# Patient Record
(demographics unavailable — no encounter records)

---

## 2024-10-13 NOTE — PHYSICAL EXAM
[Normal Breath Sounds] : Normal breath sounds [Normal Heart Sounds] : normal heart sounds [Normal Rate and Rhythm] : normal rate and rhythm [No Rash or Lesion] : No rash or lesion [Alert] : alert [Oriented to Person] : oriented to person [Oriented to Place] : oriented to place [Oriented to Time] : oriented to time [Calm] : calm [Abdomen Masses] : No abdominal masses [Abdomen Tenderness] : ~T No ~M abdominal tenderness [JVD] : no jugular venous distention  [Wheezing] : no wheezing was heard [de-identified] : healthy, NAD [de-identified] : NC/AT [de-identified] : ANALILIA, steady gait

## 2024-10-13 NOTE — PHYSICAL EXAM
[Normal Breath Sounds] : Normal breath sounds [Normal Heart Sounds] : normal heart sounds [Normal Rate and Rhythm] : normal rate and rhythm [No Rash or Lesion] : No rash or lesion [Alert] : alert [Oriented to Person] : oriented to person [Oriented to Place] : oriented to place [Oriented to Time] : oriented to time [Calm] : calm [Abdomen Masses] : No abdominal masses [Abdomen Tenderness] : ~T No ~M abdominal tenderness [JVD] : no jugular venous distention  [Wheezing] : no wheezing was heard [de-identified] : healthy, NAD [de-identified] : NC/AT [de-identified] : ANALILIA, steady gait

## 2024-10-13 NOTE — ASSESSMENT
[FreeTextEntry1] : Pleasant 69-year-old gentleman who presents for consultation regarding hemorrhoids.  Patient had been troubled with irritable bowel syndrome since his 20s. He often was treated with Lomotil and Paregoric and had chronic left lower quadrant abdominal pain. These symptoms eventually came under control. Patient had his last colonoscopy by Dr. Roberto Lopez in 2023 and was reportedly normal. He complains today of some rectal bleeding noted after bowel movements. This is seen dripping in the bowl. He has no pain or tissue prolapse.  Inspection of the anorectal area is unremarkable. Digital exam reveals no palpable mass. Sigmoidoscopy to 17 cm is negative. Anoscopy confirms the presence of 3 quadrant internal hemorrhoids.  I believe that his bleeding is from his internal hemorrhoids and I would recommend a trial of hemorrhoidal banding. The procedure was explained in detail to the patient.

## 2024-10-13 NOTE — PHYSICAL EXAM
[Normal Breath Sounds] : Normal breath sounds [Normal Heart Sounds] : normal heart sounds [Normal Rate and Rhythm] : normal rate and rhythm [No Rash or Lesion] : No rash or lesion [Alert] : alert [Oriented to Person] : oriented to person [Oriented to Place] : oriented to place [Oriented to Time] : oriented to time [Calm] : calm [Abdomen Masses] : No abdominal masses [Abdomen Tenderness] : ~T No ~M abdominal tenderness [JVD] : no jugular venous distention  [Wheezing] : no wheezing was heard [de-identified] : healthy, NAD [de-identified] : NC/AT [de-identified] : ANALILIA, steady gait

## 2024-10-13 NOTE — PHYSICAL EXAM
[Normal Breath Sounds] : Normal breath sounds [Normal Heart Sounds] : normal heart sounds [Normal Rate and Rhythm] : normal rate and rhythm [No Rash or Lesion] : No rash or lesion [Alert] : alert [Oriented to Person] : oriented to person [Oriented to Place] : oriented to place [Oriented to Time] : oriented to time [Calm] : calm [Abdomen Masses] : No abdominal masses [Abdomen Tenderness] : ~T No ~M abdominal tenderness [JVD] : no jugular venous distention  [Wheezing] : no wheezing was heard [de-identified] : healthy, NAD [de-identified] : NC/AT [de-identified] : ANALILIA, steady gait

## 2024-10-13 NOTE — HISTORY OF PRESENT ILLNESS
[FreeTextEntry1] : 70 y/o male with history of rectal bleeding presents today for initial consultation. He reports history of IBS and reports alternating diarrhea/constipation.  Several months ago he developed severe constipation and noted straining and prolonged sitting to defecate.  He now notes bright red blood in the toilet bowl after defecation.  He has started taking Miralax and Metamucil.  He reports several bowel movements daily.  In the AM he has a formed bowel movement and denies any straining/bleeding.  Later in the day, he notes that he has another bowel movement, but notes that he is straining more and the stool is thinner.  He examines the stool and notes blood in the toilet, but no blood on the tissue.  He denies pain with defecation and reports history of hemorrhoids, but states that they are not bothering him at this time.   His last colonoscopy was May 2023 by his gastroenterologist Dr. Lopez.

## 2024-10-13 NOTE — HISTORY OF PRESENT ILLNESS
[FreeTextEntry1] : 68 y/o male with history of rectal bleeding presents today for initial consultation. He reports history of IBS and reports alternating diarrhea/constipation.  Several months ago he developed severe constipation and noted straining and prolonged sitting to defecate.  He now notes bright red blood in the toilet bowl after defecation.  He has started taking Miralax and Metamucil.  He reports several bowel movements daily.  In the AM he has a formed bowel movement and denies any straining/bleeding.  Later in the day, he notes that he has another bowel movement, but notes that he is straining more and the stool is thinner.  He examines the stool and notes blood in the toilet, but no blood on the tissue.  He denies pain with defecation and reports history of hemorrhoids, but states that they are not bothering him at this time.   His last colonoscopy was May 2023 by his gastroenterologist Dr. Lopez.

## 2024-10-13 NOTE — REVIEW OF SYSTEMS
[As Noted in HPI] : as noted in HPI [Arthralgias] : arthralgias [Negative] : Heme/Lymph [FreeTextEntry3] : wears eyeglasses [FreeTextEntry9] : back pain, arthritic joint pain

## 2024-11-06 NOTE — HISTORY OF PRESENT ILLNESS
[FreeTextEntry1] : 69-year-old gentleman whom I had seen in the past for what was presumed to be bleeding from hemorrhoids.  He presents today supposedly for his first hemorrhoidal banding but stating that he does have an enlarged prostate and is having difficulty urinating.  When asked if he is still bleeding the patient seems to be unclear about this.  He is not suffering from prolapse symptomatology.  Though rare, occasionally after hemorrhoidal banding an individual can go into urinary retention.  I certainly do not want to precipitate a problem related to an intervention that may not be required at this point.  Since the patient is unclear as to whether or not he is still bleeding, I gave him suggestions.  I want Mr. Noguera to look in the bowl to check if he is still bleeding after bowel movements.  If this is the case we will move forward with hemorrhoidal banding.  If he is not bleeding, since he is not anemic, I then would not proceed with banding.